# Patient Record
Sex: FEMALE | Race: WHITE
[De-identification: names, ages, dates, MRNs, and addresses within clinical notes are randomized per-mention and may not be internally consistent; named-entity substitution may affect disease eponyms.]

---

## 2020-03-12 ENCOUNTER — HOSPITAL ENCOUNTER (EMERGENCY)
Dept: HOSPITAL 41 - JD.ED | Age: 30
Discharge: HOME | End: 2020-03-12
Payer: COMMERCIAL

## 2020-03-12 DIAGNOSIS — O99.343: Primary | ICD-10-CM

## 2020-03-12 DIAGNOSIS — Z87.891: ICD-10-CM

## 2020-03-12 DIAGNOSIS — F43.0: ICD-10-CM

## 2020-03-12 DIAGNOSIS — F41.1: ICD-10-CM

## 2020-03-12 DIAGNOSIS — Z3A.39: ICD-10-CM

## 2020-03-12 PROCEDURE — 99283 EMERGENCY DEPT VISIT LOW MDM: CPT

## 2020-03-12 NOTE — EDM.PDOC
ED HPI GENERAL MEDICAL PROBLEM





- General


Chief Complaint: General


Stated Complaint: BODYACHE/BACK PAIN


Time Seen by Provider: 20 17:37


Source of Information: Reports: Patient, Police ( present), 

RN Notes Reviewed


History Limitations: Reports: No Limitations





- History of Present Illness


INITIAL COMMENTS - FREE TEXT/NARRATIVE: 





Patient is a 29-year-old female who presents to the ED for the evaluation of 

some generalized body pains.  Patient is 39 weeks pregnant, and was cleared by 

OB at this time, there is nothing wrong with the patient, from an OB 

standpoint.  Patient is incarcerated at the [facility] in [facility].  Patient states that for the past couple months she has 

been having some intermittent pains, she states it does hurt to walk quite a 

bit.  She is complaining of generalized excruciating pain over her entire body, 

does worsen with movement.  She does not relate any trauma.  She has been given 

some Tylenol for this at the prison.  This does seem to help a little bit.  

Patient states that the pain did worsen today however at 2 PM.  She was brought 

to [place] to have her pregnancy evaluated, and then brought here for further 

evaluation of the symptoms.  Patient does have a history of anxiety, and PTSD.


  ** Generalized


Pain Score (Numeric/FACES): 10





- Related Data


 Allergies











Allergy/AdvReac Type Severity Reaction Status Date / Time


 


No Known Allergies Allergy   Verified 20 17:20











Home Meds: 


 Home Meds





LORazepam [Ativan] 1 mg PO TID PRN #12 tab 20 [Rx]


Vilazodone Hydrochloride [Viibryd] 20 mg PO DAILY 20 [History]











Past Medical History


OB/GYN History: Reports: Pregnancy


: 4


Para: 3


Psychiatric History: Reports: Anxiety, Bipolar, PTSD





Social & Family History





- Tobacco Use


Smoking Status *Q: Former Smoker


Used Tobacco, but Quit: Yes


Month/Year Tobacco Last Used: unknown





- Caffeine Use


Caffeine Use: Reports: None





- Recreational Drug Use


Recreational Drug Use: No





ED ROS GENERAL





- Review of Systems


Review Of Systems: See Below


Constitutional: Denies: Fever, Chills


Respiratory: Denies: Shortness of Breath


Cardiovascular: Denies: Chest Pain


GI/Abdominal: Reports: Constipation, Nausea.  Denies: Diarrhea, Vomiting


Musculoskeletal: Reports: Muscle Pain (generalized myalgias, seem to be 

intermittent, worsen w movement and do move around body.)


Psychiatric: Reports: Anxiety





ED EXAM, GENERAL





- Physical Exam


Exam: See Below


Exam Limited By: No Limitations


General Appearance: Alert, WD/WN, No Apparent Distress


Eye Exam: Bilateral Eye: EOMI, Normal Inspection, PERRL


Ears: Normal External Exam


Nose: Normal Inspection


Throat/Mouth: Normal Inspection, Normal Lips, Normal Teeth, Normal Gums, Normal 

Oropharynx, Normal Voice, No Airway Compromise


Head: Atraumatic, Normocephalic


Neck: Normal Inspection


Respiratory/Chest: No Respiratory Distress, Lungs Clear, Normal Breath Sounds, 

No Accessory Muscle Use, Chest Non-Tender


Cardiovascular: Normal Peripheral Pulses, Regular Rate, Rhythm, No Murmur


GI/Abdominal: Normal Bowel Sounds, Soft, Non-Tender, No Distention, No Mass


Extremities: Normal Inspection, Normal Capillary Refill


Neurological: Alert, Oriented, Normal Cognition, No Motor/Sensory Deficits


Psychiatric: Normal Affect, Normal Mood


Skin Exam: Warm, Dry, Intact, Normal Color, No Rash





Course





- Vital Signs


Last Recorded V/S: 


 Last Vital Signs











Temp  98.8 F   20 19:25


 


Pulse  98   20 19:25


 


Resp  18   20 19:25


 


BP  117/86   20 19:25


 


Pulse Ox  94 L  20 19:25














- Orders/Labs/Meds


Meds: 


Medications














Discontinued Medications














Generic Name Dose Route Start Last Admin





  Trade Name Graham  PRN Reason Stop Dose Admin


 


Acetaminophen  975 mg  20 17:50  20 18:42





  Tylenol  PO  20 17:51  975 mg





  NOW ONE   Administration





     





     





     





     


 


Lorazepam  1 mg  20 17:50  20 18:42





  Ativan  PO  20 17:51  1 mg





  ONETIME ONE   Administration





     





     





     





     














- Re-Assessments/Exams


Free Text/Narrative Re-Assessment/Exam: 





20 18:22


Patient presents to the ED for evaluation of her all over body pain.  I do 

suspect this is mostly anxiety in nature.  I did provide 975 mg of Tylenol, and 

1 mg Ativan for initial management.  Will reassess when the meds have been 

given.











Departure





- Departure


Time of Disposition: 18:52


Disposition: Home, Self-Care 01


Condition: Fair


Clinical Impression: 


 Anxiety as acute reaction to exceptional stress








- Discharge Information


*PRESCRIPTION DRUG MONITORING PROGRAM REVIEWED*: No


*COPY OF PRESCRIPTION DRUG MONITORING REPORT IN PATIENT ROHIT: No


Prescriptions: 


LORazepam [Ativan] 1 mg PO TID PRN #12 tab


 PRN Reason: Anxiety


Instructions:   Anxiety


Referrals: 


Rasheeda Simeon MD [Primary Care Provider] - 


Forms:  ED Department Discharge


Additional Instructions: 


You were evaluated in the ER today regarding your body pain and anxiety.





You were given 975 mg of Tylenol for your pain, and 1 mg Ativan for anxiety.  

This did seem to help relieve most of your symptoms.





You will be given a prescription for Ativan, 1 mg 3 times a day as needed for 

further anxiety.  Ativan can be addictive, but if used add an as-needed basis 

during pregnancy, this should not provide any detrimental effects to the fetus 

or you.  Please use sparingly.





Unfortunately, the regular muscle relaxers we use are all contraindicated in 

pregnancy.





Recommend you take Tylenol, at least 500 mg every 6 hours for pain relief.  Do 

not exceed 4000 mg in a 24-hour time span.





Please try to rest as much as possible over the next week, until your due date.

  Recommend bedrest as allowed.





Please return to the ER at any time if your symptoms change or worsen.











Sepsis Event Note





- Evaluation


Sepsis Screening Result: No Definite Risk





- Focused Exam


Vital Signs: 


 Vital Signs











  Temp Pulse Resp BP Pulse Ox


 


 20 19:25  98.8 F  98  18  117/86  94 L


 


 20 17:24  98.6 F    











Date Exam was Performed: 20


Time Exam was Performed: 21:01

## 2020-03-18 ENCOUNTER — HOSPITAL ENCOUNTER (INPATIENT)
Dept: HOSPITAL 41 - JD.OB | Age: 30
LOS: 3 days | Discharge: HOME | End: 2020-03-21
Attending: OBSTETRICS & GYNECOLOGY | Admitting: OBSTETRICS & GYNECOLOGY
Payer: COMMERCIAL

## 2020-03-18 DIAGNOSIS — F31.9: ICD-10-CM

## 2020-03-18 DIAGNOSIS — Z87.891: ICD-10-CM

## 2020-03-18 DIAGNOSIS — F41.9: ICD-10-CM

## 2020-03-18 DIAGNOSIS — Z3A.40: ICD-10-CM

## 2020-03-18 DIAGNOSIS — F43.10: ICD-10-CM

## 2020-03-19 PROCEDURE — 10907ZC DRAINAGE OF AMNIOTIC FLUID, THERAPEUTIC FROM PRODUCTS OF CONCEPTION, VIA NATURAL OR ARTIFICIAL OPENING: ICD-10-PCS | Performed by: OBSTETRICS & GYNECOLOGY

## 2020-03-19 PROCEDURE — 3E0R3BZ INTRODUCTION OF ANESTHETIC AGENT INTO SPINAL CANAL, PERCUTANEOUS APPROACH: ICD-10-PCS | Performed by: OBSTETRICS & GYNECOLOGY

## 2020-03-19 PROCEDURE — 3E033VJ INTRODUCTION OF OTHER HORMONE INTO PERIPHERAL VEIN, PERCUTANEOUS APPROACH: ICD-10-PCS | Performed by: OBSTETRICS & GYNECOLOGY

## 2020-03-19 NOTE — PCM.PREANE
Preanesthetic Assessment





- Anesthesia/Transfusion/Family Hx


Anesthesia History: Prior Anesthesia Without Reaction


Family History of Anesthesia Reaction: No


Transfusion History: No Prior Transfusion(s)


Intubation History: Unknown





- Review of Systems


General: No Symptoms


Pulmonary: No Symptoms (Former Smoker:)


Cardiovascular: No Symptoms


Gastrointestinal: No Symptoms


Neurological: No Symptoms


Other: Reports: None, Anxiety (PTSD, Bipolar)





- Physical Assessment


NPO Status Date: 03/19/20


Vital Signs: 





 Last Vital Signs











Temp  37.0 C   03/18/20 19:13


 


Pulse  95   03/18/20 21:34


 


Resp  15   03/18/20 19:13


 


BP  123/79   03/18/20 21:02


 


Pulse Ox  97   03/18/20 19:22











Height: 1.7 m


Weight: 94.347 kg


ASA Class: 2


Mental Status: Alert & Oriented x3


Airway Class: Mallampati = 2


Dentition: Reports: Normal Dentition, Caries


Thyro-Mental Finger Breadths: 3


Mouth Opening Finger Breadths: 3


ROM/Head Extension: Full


Lungs: Clear to Auscultation, Normal Respiratory Effort


Cardiovascular: Regular Rate, Regular Rhythm, No Murmurs





- Lab


Values: 





 Laboratory Last Values











WBC  7.96 K/mm3 (3.98-10.04)   03/18/20  19:29    


 


RBC  4.13 M/mm3 (3.98-5.22)   03/18/20  19:29    


 


Hgb  12.1 gm/dl (11.2-15.7)   03/18/20  19:29    


 


Hct  37.5 % (34.1-44.9)   03/18/20  19:29    


 


MCV  90.8 fl (79.4-94.8)   03/18/20  19:29    


 


MCH  29.3 pg (25.6-32.2)   03/18/20  19:29    


 


MCHC  32.3 g/dl (32.2-35.5)   03/18/20  19:29    


 


RDW Std Deviation  48.7 fL (36.4-46.3)  H  03/18/20  19:29    


 


Plt Count  181 K/mm3 (182-369)  L  03/18/20  19:29    


 


MPV  10.9 fl (9.4-12.3)   03/18/20  19:29    


 


Neut % (Auto)  68.1 % (34.0-71.1)   03/18/20  19:29    


 


Lymph % (Auto)  22.0 % (19.3-51.7)   03/18/20  19:29    


 


Mono % (Auto)  7.7 % (4.7-12.5)   03/18/20  19:29    


 


Eos % (Auto)  0.9  (0.7-5.8)   03/18/20 19:29    


 


Baso % (Auto)  0.3 % (0.1-1.2)   03/18/20  19:29    


 


Neut # (Auto)  5.43 K/mm3 (1.56-6.13)   03/18/20  19:29    


 


Lymph # (Auto)  1.75 K/mm3 (1.18-3.74)   03/18/20 19:29    


 


Mono # (Auto)  0.61 K/mm3 (0.24-0.36)  H  03/18/20  19:29    


 


Eos # (Auto)  0.07 K/mm3 (0.04-0.36)   03/18/20  19:29    


 


Baso # (Auto)  0.02 K/mm3 (0.01-0.08)   03/18/20  19:29    


 


Urine Opiates Screen  Negative  (ZKTRQX=647)   03/18/20  19:50    


 


Ur Buprenorphine Scrn  Negative  (CUTOFF=10)   03/18/20  19:50    


 


Ur Oxycodone Screen  Negative  (VZE8LT=589)   03/18/20  19:50    


 


Urine Methadone Screen  Negative  (MCMZER=515)   03/18/20  19:50    


 


Ur Propoxyphene Screen  Negative  (XATFTO=586)   03/18/20  19:50    


 


Ur Barbiturates Screen  Negative  (JTOPZL=607)   03/18/20  19:50    


 


Ur Tricyclics Screen  Negative  (JAOIUI=190)   03/18/20  19:50    


 


Ur Phencyclidine Scrn  Negative  (CUTOFF=25)   03/18/20  19:50    


 


Ur Amphetamine Screen  Negative  (YNWGGV=060)   03/18/20  19:50    


 


U Methamphetamines Scrn  Negative  (TCLSOE=188)   03/18/20  19:50    


 


U Benzodiazepines Scrn  Negative  (LMCEJZ=684)   03/18/20  19:50    


 


U Cocaine Metab Screen  Negative  (SZCSOP=816)   03/18/20  19:50    


 


U Marijuana (THC) Screen  Negative  (CUTOFF=50)   03/18/20  19:50    


 


RPR  Non-reactive  (NONREACTIVE)   03/18/20  19:29    


 


MRSA (PCR)  Negative   03/18/20  19:50    








Above labs reviewed and noted and within acceptable ranges to proceed with 

epidural if desired.





- Allergies


Allergies/Adverse Reactions: 


 Allergies











Allergy/AdvReac Type Severity Reaction Status Date / Time


 


No Known Allergies Allergy   Verified 03/18/20 19:12














- Anesthesia Plan


Pre-Op Medication Ordered: None





- Acknowledgements


Anesthesia Type Planned: Epidural


Pt an Appropriate Candidate for the Planned Anesthesia: Yes


Alternatives and Risks of Anesthesia Discussed w Pt/Guardian: Yes


Pt/Guardian Understands and Agrees with Anesthesia Plan: Yes





PreAnesthesia Questionnaire





- Past Health History


Medical/Surgical History: Denies Medical/Surgical History


HEENT History: Reports: None


Other Gastrointestinal History: heart burn during pregnancy


OB/GYN History: Reports: Pregnancy


Psychiatric History: Reports: Anxiety, Bipolar, PTSD





- Infectious Disease History


Infectious Disease History: Reports: MRSA


Other Infectious Disease History: Cleared for MRSA in the clinic, 2nd nasal 

swab taken today





- Past Surgical History


HEENT Surgical History: Reports: Other (See Below)


Other HEENT Surgeries/Procedures: wisdom teeth-removed as a teenager


GI Surgical History: Reports: None





- SUBSTANCE USE


Smoking Status *Q: Former Smoker


Tobacco Use Within Last Twelve Months: Cigarettes


Second Hand Smoke Exposure: No


Recreational Drug Use History: No





- HOME MEDS


Home Medications: 


 Home Meds





LORazepam [Ativan] 1 mg PO TID PRN #12 tab 03/12/20 [Rx]


Vilazodone Hydrochloride [Viibryd] 20 mg PO DAILY 03/12/20 [History]


Calcium Carbonate [Tums] 500 mg PO ASDIRECTED 03/18/20 [History]


Cocoa Butter/Phenylephrine [Preparation H Supp] 1 each RC ASDIRECTED 03/18/20 [

History]


Docusate Sodium [Colace] 1 cap PO DAILY 03/18/20 [History]


NMD865/Iron Fumarate/FA/DSS [Prenatal 19 Tablet] 1 each PO DAILY 03/18/20 [

History]











- CURRENT (IN HOUSE) MEDS


Current Meds: 





 Current Medications





Acetaminophen (Tylenol)  650 mg PO Q4H PRN


   PRN Reason: Pain (Mild 1-3) and fever


Calcium Carbonate/Glycine (Tums)  1,000 mg PO Q2H PRN


   PRN Reason: Indigestion


Lactated Ringer's (Ringers, Lactated)  1,000 mls @ 100 mls/hr IV ASDIRECTED CUCA


   Last Admin: 03/18/20 22:46 Dose:  100 mls/hr


Oxytocin/Lactated Ringer's (Pitocin In Lr 10 Units/1,000 Ml)  10 unit in 1,000 

mls @ 12 mls/hr IV TITRATE CUCA; Protocol


   Last Titration: 03/19/20 03:15 Dose:  20 munits/min, 120 mls/hr


Oxytocin/Lactated Ringer's (Pitocin In Lr 10 Units/1,000 Ml)  10 unit in 1,000 

mls @ 500 mls/hr IV .CONTINUOUS CUCA


Ondansetron HCl (Zofran)  4 mg IVPUSH Q4H PRN


   PRN Reason: Nausea/Vomiting


Sodium Chloride (Saline Flush)  10 ml FLUSH ASDIRECTED PRN


   PRN Reason: Keep Vein Open





Discontinued Medications





Lidocaine HCl (Xylocaine 1%)  50 ml INJECT ONETIME ONE


   Stop: 03/18/20 19:14


Nalbuphine HCl (Nubain)  10 mg IM ONETIME ONE


   Stop: 03/19/20 06:58


   Last Admin: 03/19/20 07:04 Dose:  Not Given

## 2020-03-19 NOTE — PCM.SN
- Free Text/Narrative


Note: 





Anesthesia Note:


Start: 0750


Stop: 0815








Anesthesia requested for epidural placement.


Upon arrival patient dilated to 8cm.


Assessment and consent obtained.


Upon the placement of epidural, patient c/o of increased pressure, now complete.





No epidural placed.


Verbal reassurance given.





Dr. Simeon called and present for delivery.

## 2020-03-19 NOTE — PCM.SN
- Free Text/Narrative


Note: 





Stage I - Patient presented for induction of labor. Pitocin. AROM of meconium. 

Progressed to complete with reassuring FHT. 





Stage II -  of viable male, 4210g, 7/9 APGARS at 824. Head delivered in 

controlled manner over intact perineum. Body and shoulders atraumatically. To 

maternal abdomen. Initially no cry but but vigorous by 1 minute. Cord clamped 

and cut and to warmer. Cord blood and cord segment sent.





Stage III -  of intact placenta, 3vc, no laceration. . Pitocin and 

cytotec buccal given.

## 2020-03-20 NOTE — PCM.LDHP
L&D History of Present Illness





- General


Date of Service: 03/18/20


Admit Problem/Dx: 


 Admission Diagnosis/Problem











Admission Diagnosis/Problem    Pregnancy














Source of Information: Patient





- History of Present Illness


Introduction:: 





30 year old here for induction.


Pain Score: 2





- Related Data


Allergies/Adverse Reactions: 


 Allergies











Allergy/AdvReac Type Severity Reaction Status Date / Time


 


No Known Allergies Allergy   Verified 03/18/20 19:12











Home Medications: 


 Home Meds





LORazepam [Ativan] 1 mg PO TID PRN #12 tab 03/12/20 [Rx]


Vilazodone Hydrochloride [Viibryd] 20 mg PO DAILY 03/12/20 [History]


Calcium Carbonate [Tums] 500 mg PO ASDIRECTED 03/18/20 [History]


Cocoa Butter/Phenylephrine [Preparation H Supp] 1 each RC ASDIRECTED 03/18/20 [

History]


Docusate Sodium [Colace] 1 cap PO DAILY 03/18/20 [History]


PAF189/Iron Fumarate/FA/DSS [Prenatal 19 Tablet] 1 each PO DAILY 03/18/20 [

History]











Past Medical History





- Past Health History


Medical/Surgical History: Denies Medical/Surgical History


HEENT History: Reports: None


Other Gastrointestinal History: heart burn during pregnancy


OB/GYN History: Reports: Pregnancy


Psychiatric History: Reports: Anxiety, Bipolar, PTSD





- Infectious Disease History


Infectious Disease History: Reports: MRSA


Other Infectious Disease History: Cleared for MRSA in the clinic, 2nd nasal 

swab taken today





- Past Surgical History


HEENT Surgical History: Reports: Other (See Below)


Other HEENT Surgeries/Procedures: wisdom teeth-removed as a teenager


GI Surgical History: Reports: None





Social & Family History





- Family History


Family Medical History: Noncontributory





- Tobacco Use


Smoking Status *Q: Former Smoker


Years of Tobacco use: 15


Packs/Tins Daily: 0.5


Used Tobacco, but Quit: Yes


Month/Year Tobacco Last Used: 9/2019


Second Hand Smoke Exposure: No





- Caffeine Use


Caffeine Use: Reports: None





- Recreational Drug Use


Recreational Drug Use: No





H&P Review of Systems





- Review of Systems:


Review Of Systems: See Below


General: Reports: No Symptoms


HEENT: Reports: No Symptoms


Pulmonary: Reports: No Symptoms


Cardiovascular: Reports: No Symptoms


Gastrointestinal: Reports: No Symptoms


Genitourinary: Reports: No Symptoms


Musculoskeletal: Reports: No Symptoms


Skin: Reports: No Symptoms


Psychiatric: Reports: No Symptoms


Neurological: Reports: No Symptoms


Hematologic/Lymphatic: Reports: No Symptoms


Immunologic: Reports: No Symptoms





L&D Exam





- Exam


Exam: See Below





- Vital Signs


Vital Signs: 


 Last Vital Signs











Temp  37.2 C   03/19/20 20:39


 


Pulse  94   03/19/20 20:39


 


Resp  15   03/19/20 20:39


 


BP  107/63   03/19/20 20:39


 


Pulse Ox  98   03/19/20 20:39











Weight: 94.347 kg





- OB Specific


Contraction Intensity: Moderate to Strong


Fetal Movement: Active


Fetal Heart Tones: Present


Fetal Heart Rate (FHR) Variability: Moderate (6-25 bmp)


Birth Presentation: Vertex





- Cuevas Score


Cuevas Score Cervix Position: Anterior


Cuevas Score Consistency: Soft


Cuevas Score Effacement: 51-70%


Cuevas Score Dilation: 3-4 cm


Cuevas Score Infant's Station: -2


Cuevas Score Total: 9





- Exam


General: Alert, Oriented


HEENT: PERRLA, Conjunctiva Clear, EACs Clear, EOMI, Hearing Intact, Mucosa 

Moist & Pink, Nares Patent, Normal Nasal Septum, Posterior Pharynx Clear, TMs 

Clear


Neck: Supple, Trachea Midline


Lungs: Clear to Auscultation, Normal Respiratory Effort


Cardiovascular: Regular Rate, Regular Rhythm


GI/Abdominal Exam: Normal Bowel Sounds, Soft, Non-Tender, No Organomegaly, No 

Distention, No Abnormal Bruit, No Mass, Pelvis Stable


Rectal Exam: Normal Exam, Normal Rectal Tone


Extremities: Normal Inspection, Normal Range of Motion, Non-Tender, No Pedal 

Edema, Normal Capillary Refill


Skin: Warm, Dry, Intact


Neurological: Cranial Nerves Intact, Reflexes Equal Bilateral


Psychiatric: Alert, Normal Affect, Normal Mood





- Patient Data


Result Diagrams: 


 03/18/20 19:29





Problem List Initiated/Reviewed/Updated: Yes


Orders Last 24hrs: 


 Active Orders 24 hr











 Category Date Time Status


 


 Activity as Tolerated [RC] PER UNIT ROUTINE Care  03/19/20 09:08 Active


 


 Notify Provider [RC] ASDIRECTED Care  03/19/20 07:06 Inactive


 


 Oxygen Therapy [RC] ASDIRECTED Care  03/19/20 07:06 Inactive


 


 Pulse Oximetry [RC] ASDIRECTED Care  03/19/20 07:06 Inactive


 


 Vital Signs [RC] 09,15,21,03 Care  03/19/20 09:08 Active


 


 CBC W/O DIFF,HEMOGRAM [HEME] AM Lab  03/20/20 05:11 Ordered


 


 Acetaminophen [Tylenol] Med  03/19/20 14:52 Active





 650 mg PO Q4H PRN   


 


 Benzocaine/Menthol [Dermoplast Pain Relief Spray] Med  03/19/20 09:08 Active





 See Dose Instructions  TOP ASDIRECTED PRN   


 


 Cyclobenzaprine [Flexeril] Med  03/19/20 18:24 Active





 10 mg PO TID PRN   


 


 Docusate Sodium [Colace] Med  03/19/20 14:52 Active





 100 mg PO BID PRN   


 


 Hydrocortisone Acetate [Anucort-HC] Med  03/19/20 09:08 Active





 25 mg RECTAL BID PRN   


 


 Ibuprofen [Motrin] Med  03/19/20 14:52 Active





 600 mg PO Q4H PRN   


 


 LORazepam [Ativan] Med  03/19/20 20:06 Active





 1 mg PO Q6H PRN   


 


 witch hazeL [Tucks] Med  03/19/20 09:08 Active





 1 pad TOP ASDIRECTED PRN   


 


 Assess Lochia [WOMSER] Per Unit Routine Oth  03/19/20 09:08 Ordered


 


 Assess Uterine Involution [WOMSER] Per Unit Routine Oth  03/19/20 09:08 Ordered


 


 Breast Pump [WOMSER] Per Unit Routine Oth  03/19/20 09:08 Ordered


 


 Heat Therapy [OM.PC] PRN Oth  03/19/20 09:08 Ordered


 


 Heat Therapy [OM.PC] PRN Oth  03/20/20 09:08 Ordered


 


 Medication Administration Instruction [OM.PC] Routine Oth  03/19/20 09:08 

Ordered


 


 Perineal Care [OM.PC] Per Unit Routine Oth  03/19/20 09:08 Ordered


 


 Sitz Bath [OM.PC] Per Unit Routine Oth  03/19/20 09:08 Ordered








 Medication Orders





Acetaminophen (Tylenol)  650 mg PO Q4H PRN


   PRN Reason: Pain


   Last Admin: 03/19/20 16:56  Dose: 650 mg


Benzocaine/Menthol (Dermoplast Pain Relief Spray)  0 gm TOP ASDIRECTED PRN


   PRN Reason: Perineal Comfort Measure


Cyclobenzaprine HCl (Flexeril)  10 mg PO TID PRN


   PRN Reason: muscle ache


   Last Admin: 03/19/20 19:04  Dose: 10 mg


Docusate Sodium (Colace)  100 mg PO BID PRN


   PRN Reason: Constipation


Hydrocortisone Acetate (Anucort-Hc)  25 mg RECTAL BID PRN


   PRN Reason: Hemorrhoid pain


Ibuprofen (Motrin)  600 mg PO Q4H PRN


   PRN Reason: Pain


   Last Admin: 03/19/20 22:41  Dose: 600 mg


   Admin: 03/19/20 15:02  Dose: 600 mg


Lorazepam (Ativan)  1 mg PO Q6H PRN


   PRN Reason: ANXIETY


   Last Admin: 03/19/20 22:41  Dose: 1 mg


Witch Hazel (Tucks)  1 pad TOP ASDIRECTED PRN


   PRN Reason: Pain








Assessment/Plan Comment:: 





Here for induction. Pitocin. AROM clear fluid.

## 2020-03-20 NOTE — PCM.PNPP
- General Info


Date of Service: 03/20/20


Functional Status: Reports: Pain Controlled





- Review of Systems


General: Reports: No Symptoms


HEENT: Reports: No Symptoms


Pulmonary: Reports: No Symptoms


Cardiovascular: Reports: No Symptoms


Gastrointestinal: Reports: No Symptoms


Genitourinary: Reports: No Symptoms


Musculoskeletal: Reports: No Symptoms


Skin: Reports: No Symptoms


Neurological: Reports: No Symptoms


Psychiatric: Reports: No Symptoms





- General Info


Date of Service: 03/20/20





- Patient Data


Vital Signs - Most Recent: 


 Last Vital Signs











Temp  37.2 C   03/19/20 20:39


 


Pulse  94   03/19/20 20:39


 


Resp  15   03/19/20 20:39


 


BP  107/63   03/19/20 20:39


 


Pulse Ox  98   03/19/20 20:39











Weight - Most Recent: 94.347 kg


I&O - Last 24 Hours: 


 Intake & Output











 03/19/20 03/19/20 03/20/20





 14:59 22:59 06:59


 


Intake Total 3320  


 


Balance 3320  











Med Orders - Current: 


 Current Medications





Acetaminophen (Tylenol)  650 mg PO Q4H PRN


   PRN Reason: Pain


   Last Admin: 03/19/20 16:56 Dose:  650 mg


Benzocaine/Menthol (Dermoplast Pain Relief Spray)  0 gm TOP ASDIRECTED PRN


   PRN Reason: Perineal Comfort Measure


Cyclobenzaprine HCl (Flexeril)  10 mg PO TID PRN


   PRN Reason: muscle ache


   Last Admin: 03/19/20 19:04 Dose:  10 mg


Docusate Sodium (Colace)  100 mg PO BID PRN


   PRN Reason: Constipation


Hydrocortisone Acetate (Anucort-Hc)  25 mg RECTAL BID PRN


   PRN Reason: Hemorrhoid pain


Ibuprofen (Motrin)  600 mg PO Q4H PRN


   PRN Reason: Pain


   Last Admin: 03/19/20 22:41 Dose:  600 mg


Lorazepam (Ativan)  1 mg PO Q6H PRN


   PRN Reason: ANXIETY


   Last Admin: 03/19/20 22:41 Dose:  1 mg


Witch Hazel (Tucks)  1 pad TOP ASDIRECTED PRN


   PRN Reason: Pain





Discontinued Medications





Acetaminophen (Tylenol)  650 mg PO Q4H PRN


   PRN Reason: Pain (Mild 1-3) and fever


Calcium Carbonate/Glycine (Tums)  1,000 mg PO Q2H PRN


   PRN Reason: Indigestion


Ephedrine Sulfate (Ephedrine Sulfate)  5 mg IVPUSH ASDIRECTED PRN


   PRN Reason: Hypotension


Fentanyl (Sublimaze)  100 mcg EPIDUR Q3H PRN


   PRN Reason: Pain


   Last Admin: 03/19/20 07:49 Dose:  100 mcg


Fentanyl/Bupivacaine HCl (Fentanyl/Bupivacaine/Ns 2 Mcg-0.125% 100 Ml)  100 ml 

EPIDUR ASDIRECTED CUCA


   Last Admin: 03/19/20 07:49 Dose:  100 ml


Lactated Ringer's (Ringers, Lactated)  1,000 mls @ 100 mls/hr IV ASDIRECTED CUCA


   Last Admin: 03/19/20 07:48 Dose:  100 mls/hr


Oxytocin/Lactated Ringer's (Pitocin In Lr 10 Units/1,000 Ml)  10 unit in 1,000 

mls @ 12 mls/hr IV TITRATE CUCA; Protocol


   Last Titration: 03/19/20 03:15 Dose:  20 munits/min, 120 mls/hr


Oxytocin/Lactated Ringer's (Pitocin In Lr 10 Units/1,000 Ml)  10 unit in 1,000 

mls @ 500 mls/hr IV .CONTINUOUS CUCA


Lidocaine HCl (Xylocaine 1%)  50 ml INJECT ONETIME ONE


   Stop: 03/18/20 19:14


   Last Admin: 03/19/20 22:15 Dose:  Not Given


Misoprostol (Cytotec) Confirm Administered Dose 600 mcg .ROUTE .STK-MED ONE


   Stop: 03/19/20 08:28


   Last Admin: 03/19/20 10:48 Dose:  600 mcg


Nalbuphine HCl (Nubain)  10 mg IM ONETIME ONE


   Stop: 03/19/20 06:58


   Last Admin: 03/19/20 07:04 Dose:  Not Given


Ondansetron HCl (Zofran)  4 mg IVPUSH Q4H PRN


   PRN Reason: Nausea/Vomiting


Ondansetron HCl (Zofran)  4 mg IVPUSH ONETIME PRN


   PRN Reason: Nausea/Vomiting


Sodium Chloride (Saline Flush)  10 ml FLUSH ASDIRECTED PRN


   PRN Reason: Keep Vein Open











- Infant Interaction


Support Person: Other (see below)





- Postpartum Recovery Exam


Fundal Tone: Firm


Fundal Level: At Umbilicus


Fundal Placement: Midline


Lochia Amount: Small


Lochia Color: Rubra/Red


Perineum Description: Intact, Minimal Bruising/Swelling


Episiotomy/Laceration: None


Bladder Status: Voiding


Urinary Elimination: Voided





- Exam


General: Alert, Oriented


HEENT: Pupils Equal


Neck: Supple


Lungs: Clear to Auscultation, Normal Respiratory Effort


GI/Abdominal Exam: Normal Bowel Sounds, Soft, Non-Tender, No Organomegaly, No 

Distention, No Abnormal Bruit, No Mass, Pelvis Stable


Extremities: Normal Inspection, Normal Range of Motion, Non-Tender, No Pedal 

Edema, Normal Capillary Refill


Skin: Warm, Dry, Intact


Wound/Incisions: Healing Well


Neurological: No New Focal Deficit


Psy/Mental Status: Alert, Normal Affect, Normal Mood





- Problem List Review


Problem List Initiated/Reviewed/Updated: Yes





- My Orders


Last 24 Hours: 


My Active Orders





03/19/20 09:08


Activity as Tolerated [RC] PER UNIT ROUTINE 


Vital Signs [RC] 09,15,21,03 


Benzocaine/Menthol [Dermoplast Pain Relief Spray]   See Dose Instructions  TOP 

ASDIRECTED PRN 


Hydrocortisone Acetate [Anucort-HC]   25 mg RECTAL BID PRN 


witch hazeL [Tucks]   1 pad TOP ASDIRECTED PRN 


Assess Lochia [WOMSER] Per Unit Routine 


Assess Uterine Involution [WOMSER] Per Unit Routine 


Breast Pump [WOMSER] Per Unit Routine 


Heat Therapy [OM.PC] PRN 


Medication Administration Instruction [OM.PC] Routine 


Perineal Care [OM.PC] Per Unit Routine 


Sitz Bath [OM.PC] Per Unit Routine 





03/19/20 14:52


Acetaminophen [Tylenol]   650 mg PO Q4H PRN 


Docusate Sodium [Colace]   100 mg PO BID PRN 


Ibuprofen [Motrin]   600 mg PO Q4H PRN 





03/19/20 18:24


Cyclobenzaprine [Flexeril]   10 mg PO TID PRN 





03/19/20 20:06


LORazepam [Ativan]   1 mg PO Q6H PRN 





03/20/20 05:11


CBC W/O DIFF,HEMOGRAM [HEME] AM 





03/20/20 09:08


Heat Therapy [OM.PC] PRN 














- Assessment


Assessment:: 





Term delivery. 


PPD 1


Doing great. Discharge tomorrow.

## 2020-03-21 NOTE — PCM.DCSUM1
**Discharge Summary





- Hospital Course


Free Text/Narrative:: 








Stage I - Patient presented for induction of labor. Pitocin. AROM of meconium. 

Progressed to complete with reassuring FHT. 





Stage II -  of viable male, 4210g, 7/9 APGARS at 824. Head delivered in 

controlled manner over intact perineum. Body and shoulders atraumatically. To 

maternal abdomen. Initially no cry but but vigorous by 1 minute. Cord clamped 

and cut and to warmer. Cord blood and cord segment sent.





Stage III -  of intact placenta, 3vc, no laceration. . Pitocin and 

cytotec buccal given.





Postpartum patient is done well. Even with the postpartum blood loss she is 

doing well. She is bottlefeeding. Ready for discharge. Discharged home to 

private residence. Condition: Good.





Diagnosis: Stroke: No





- Discharge Data


Discharge Date: 20


Discharge Disposition: Home, Self-Care 01


Condition: Good





- Referral to Home Health


Primary Care Physician: 


Rasheeda Simeon MD








- Patient Instructions


Diet: Regular Diet as Tolerated


Activity: As Tolerated (No intercourse or tampons until bleeding results)


Driving: May Drive Today


Showering/Bathing: May Shower (May take a bath)


Notify Provider of: Fever, Increased Pain, Swelling and Redness, Nausea and/or 

Vomiting





- Discharge Plan


Home Medications: 


 Home Meds





LORazepam [Ativan] 1 mg PO TID PRN #12 tab 20 [Rx]


Vilazodone Hydrochloride [Viibryd] 20 mg PO DAILY 20 [History]


Cocoa Butter/Phenylephrine [Preparation H] 1 each RC ASDIRECTED 20 [

History]


Docusate Sodium [Colace] 1 cap PO DAILY 20 [History]


CHP326/Iron Fumarate/FA/DSS [Prenatal 19 Tablet] 1 each PO DAILY 20 [

History]


Acetaminophen [Tylenol] 650 mg PO Q4H PRN  tablet 20 [Rx]


Ibuprofen [Motrin] 600 mg PO Q4H PRN  tablet 20 [Rx]








Patient Handouts:  Postpartum Care After Vaginal Delivery


Referrals: 


Rasheeda Simeon MD [Primary Care Provider] -  (Patient is to call 

clinic in 2 days to make an appointment to see Dr. Simeon as recommended.)





- Discharge Summary/Plan Comment


DC Time >30 min.: No


Discharge Summary/Plan Comment: 








Discharge instructions:





1. Discharge home





2. Diet, activity and follow-up discussed with patient. Recommend regular diet.





3. Precautions given concern increased pain, bleeding, temperature, signs/

symptoms of DVT/PE.





4. Medications per home medication was printed, discussed with and given to the 

patient.





5. Return to clinic-Dr. Rasheeda Simeon, [facility]-patient is to 

call in 2 days for a follow-up appointment.





Diagnosis: Term pregnancy-delivered 





Condition: Good





- Patient Data


Vitals - Most Recent: 


 Last Vital Signs











Temp  36.4 C   20 03:34


 


Pulse  80   20 03:34


 


Resp  16   20 03:34


 


BP  104/76   20 03:34


 


Pulse Ox  96   20 03:34











Weight - Most Recent: 94.347 kg


I&O - Last 24 hours: 


 Intake & Output











 20





 22:59 06:59 14:59


 


Intake Total 180  


 


Balance 180  











Med Orders - Current: 


 Current Medications





Acetaminophen (Tylenol)  650 mg PO Q4H PRN


   PRN Reason: Pain


   Last Admin: 20 16:56 Dose:  650 mg


Benzocaine/Menthol (Dermoplast Pain Relief Spray)  0 gm TOP ASDIRECTED PRN


   PRN Reason: Perineal Comfort Measure


Cyclobenzaprine HCl (Flexeril)  10 mg PO TID PRN


   PRN Reason: muscle ache


   Last Admin: 20 21:31 Dose:  10 mg


Docusate Sodium (Colace)  100 mg PO BID PRN


   PRN Reason: Constipation


Hydrocortisone Acetate (Anucort-Hc)  25 mg RECTAL BID PRN


   PRN Reason: Hemorrhoid pain


Ibuprofen (Motrin)  600 mg PO Q4H PRN


   PRN Reason: Pain


   Last Admin: 20 23:47 Dose:  600 mg


Lorazepam (Ativan)  1 mg PO Q6H PRN


   PRN Reason: ANXIETY


   Last Admin: 20 08:15 Dose:  1 mg


Viibryd 20 Mg  0 each PO DAILY CUCA


   Last Admin: 20 08:05 Dose:  1 each


Witch Hazel (Tucks)  1 pad TOP ASDIRECTED PRN


   PRN Reason: Pain





Discontinued Medications





Acetaminophen (Tylenol)  650 mg PO Q4H PRN


   PRN Reason: Pain (Mild 1-3) and fever


Calcium Carbonate/Glycine (Tums)  1,000 mg PO Q2H PRN


   PRN Reason: Indigestion


Ephedrine Sulfate (Ephedrine Sulfate)  5 mg IVPUSH ASDIRECTED PRN


   PRN Reason: Hypotension


Fentanyl (Sublimaze)  100 mcg EPIDUR Q3H PRN


   PRN Reason: Pain


   Last Admin: 20 07:49 Dose:  100 mcg


Fentanyl/Bupivacaine HCl (Fentanyl/Bupivacaine/Ns 2 Mcg-0.125% 100 Ml)  100 ml 

EPIDUR ASDIRECTED CUCA


   Last Admin: 20 07:49 Dose:  100 ml


Lactated Ringer's (Ringers, Lactated)  1,000 mls @ 100 mls/hr IV ASDIRECTED CUCA


   Last Admin: 20 07:48 Dose:  100 mls/hr


Oxytocin/Lactated Ringer's (Pitocin In Lr 10 Units/1,000 Ml)  10 unit in 1,000 

mls @ 12 mls/hr IV TITRATE CUCA; Protocol


   Last Titration: 20 03:15 Dose:  20 munits/min, 120 mls/hr


Oxytocin/Lactated Ringer's (Pitocin In Lr 10 Units/1,000 Ml)  10 unit in 1,000 

mls @ 500 mls/hr IV .CONTINUOUS CUCA


Lidocaine HCl (Xylocaine 1%)  50 ml INJECT ONETIME ONE


   Stop: 20 19:14


   Last Admin: 20 22:15 Dose:  Not Given


Lorazepam (Ativan)  1 mg PO Q6H PRN


   PRN Reason: ANXIETY


   Last Admin: 20 15:07 Dose:  1 mg


Misoprostol (Cytotec) Confirm Administered Dose 600 mcg .ROUTE .STK-MED ONE


   Stop: 20 08:28


   Last Admin: 20 10:48 Dose:  600 mcg


Nalbuphine HCl (Nubain)  10 mg IM ONETIME ONE


   Stop: 20 06:58


   Last Admin: 20 07:04 Dose:  Not Given


Ondansetron HCl (Zofran)  4 mg IVPUSH Q4H PRN


   PRN Reason: Nausea/Vomiting


Ondansetron HCl (Zofran)  4 mg IVPUSH ONETIME PRN


   PRN Reason: Nausea/Vomiting


Sodium Chloride (Saline Flush)  10 ml FLUSH ASDIRECTED PRN


   PRN Reason: Keep Vein Open